# Patient Record
Sex: MALE | Race: WHITE | NOT HISPANIC OR LATINO | URBAN - METROPOLITAN AREA
[De-identification: names, ages, dates, MRNs, and addresses within clinical notes are randomized per-mention and may not be internally consistent; named-entity substitution may affect disease eponyms.]

---

## 2023-03-22 ENCOUNTER — ON CAMPUS - OUTPATIENT (OUTPATIENT)
Dept: URBAN - METROPOLITAN AREA HOSPITAL 81 | Facility: HOSPITAL | Age: 23
End: 2023-03-22

## 2023-03-22 DIAGNOSIS — R94.5 ABNORMAL RESULTS OF LIVER FUNCTION STUDIES: ICD-10-CM

## 2023-03-22 DIAGNOSIS — R93.2 ABNORMAL FINDINGS ON DIAGNOSTIC IMAGING OF LIVER AND BILIARY: ICD-10-CM

## 2023-03-22 DIAGNOSIS — R93.5 ABNORMAL FINDINGS ON DIAGNOSTIC IMAGING OF OTHER ABDOMINAL R: ICD-10-CM

## 2023-03-22 DIAGNOSIS — R74.8 ABNORMAL LEVELS OF OTHER SERUM ENZYMES: ICD-10-CM

## 2023-03-22 PROCEDURE — 99222 1ST HOSP IP/OBS MODERATE 55: CPT | Performed by: INTERNAL MEDICINE

## 2023-04-07 ENCOUNTER — OFFICE (OUTPATIENT)
Dept: URBAN - METROPOLITAN AREA CLINIC 9 | Facility: CLINIC | Age: 23
End: 2023-04-07

## 2023-04-07 VITALS
WEIGHT: 165 LBS | HEART RATE: 66 BPM | OXYGEN SATURATION: 99 % | DIASTOLIC BLOOD PRESSURE: 71 MMHG | SYSTOLIC BLOOD PRESSURE: 113 MMHG | HEIGHT: 68 IN | RESPIRATION RATE: 14 BRPM

## 2023-04-07 DIAGNOSIS — B27.90 INFECTIOUS MONONUCLEOSIS, UNSPECIFIED WITHOUT COMPLICATION: ICD-10-CM

## 2023-04-07 PROCEDURE — 99213 OFFICE O/P EST LOW 20 MIN: CPT | Performed by: NURSE PRACTITIONER

## 2023-04-07 NOTE — SERVICEHPINOTES
Mr. Pastrana is a 22 y/o male here for hospital follow-up. He presented to  He originally presented to [facility] in [place] for upper quadrant pain.  A CT abdomen was performed and showed gallbladder wall thickening and pericholecystic fluid around the gallbladder.  He was also found to have elevated LFTs -  , ALT 1092, , Positive for MONO. He was transferred to [facility] for GI services. HIDA scan during admission was negative. Determined that the abnormal gallbladder findings and elevated LFTs were likely inflammatory from EBV/mono diagnosis. Since hospital discharge he states he has been feeling great. He has had no further abdominal pain in the last week. His energy level is back to normal. Will collect labs today. Instructed to avoid contact sports or strenuous physical activity for 1 month.

## 2023-04-08 LAB
CBC, PLATELET, NO DIFFERENTIAL: HEMATOCRIT: 45.6 % (ref 37.5–51)
CBC, PLATELET, NO DIFFERENTIAL: HEMOGLOBIN: 15.4 G/DL (ref 13–17.7)
CBC, PLATELET, NO DIFFERENTIAL: MCH: 30.1 PG (ref 26.6–33)
CBC, PLATELET, NO DIFFERENTIAL: MCHC: 33.8 G/DL (ref 31.5–35.7)
CBC, PLATELET, NO DIFFERENTIAL: MCV: 89 FL (ref 79–97)
CBC, PLATELET, NO DIFFERENTIAL: PLATELETS: 271 X10E3/UL (ref 150–450)
CBC, PLATELET, NO DIFFERENTIAL: RBC: 5.12 X10E6/UL (ref 4.14–5.8)
CBC, PLATELET, NO DIFFERENTIAL: RDW: 13.1 % (ref 11.6–15.4)
CBC, PLATELET, NO DIFFERENTIAL: WBC: 6.3 X10E3/UL (ref 3.4–10.8)
COMP. METABOLIC PANEL (14): A/G RATIO: 1.5 (ref 1.2–2.2)
COMP. METABOLIC PANEL (14): ALBUMIN: 4.6 G/DL (ref 4.1–5.2)
COMP. METABOLIC PANEL (14): ALKALINE PHOSPHATASE: 112 IU/L (ref 44–121)
COMP. METABOLIC PANEL (14): ALT (SGPT): 149 IU/L — HIGH (ref 0–44)
COMP. METABOLIC PANEL (14): AST (SGOT): 55 IU/L — HIGH (ref 0–40)
COMP. METABOLIC PANEL (14): BILIRUBIN, TOTAL: 0.6 MG/DL (ref 0–1.2)
COMP. METABOLIC PANEL (14): BUN/CREATININE RATIO: 21 — HIGH (ref 9–20)
COMP. METABOLIC PANEL (14): BUN: 24 MG/DL — HIGH (ref 6–20)
COMP. METABOLIC PANEL (14): CALCIUM: 9.8 MG/DL (ref 8.7–10.2)
COMP. METABOLIC PANEL (14): CARBON DIOXIDE, TOTAL: 25 MMOL/L (ref 20–29)
COMP. METABOLIC PANEL (14): CHLORIDE: 103 MMOL/L (ref 96–106)
COMP. METABOLIC PANEL (14): CREATININE: 1.13 MG/DL (ref 0.76–1.27)
COMP. METABOLIC PANEL (14): EGFR: 94 ML/MIN/1.73 (ref 59–?)
COMP. METABOLIC PANEL (14): GLOBULIN, TOTAL: 3 G/DL (ref 1.5–4.5)
COMP. METABOLIC PANEL (14): GLUCOSE: 76 MG/DL (ref 70–99)
COMP. METABOLIC PANEL (14): POTASSIUM: 4.4 MMOL/L (ref 3.5–5.2)
COMP. METABOLIC PANEL (14): PROTEIN, TOTAL: 7.6 G/DL (ref 6–8.5)
COMP. METABOLIC PANEL (14): SODIUM: 146 MMOL/L — HIGH (ref 134–144)